# Patient Record
Sex: MALE | Race: WHITE | NOT HISPANIC OR LATINO | URBAN - METROPOLITAN AREA
[De-identification: names, ages, dates, MRNs, and addresses within clinical notes are randomized per-mention and may not be internally consistent; named-entity substitution may affect disease eponyms.]

---

## 2018-04-13 ENCOUNTER — AMBULATORY - HEALTHEAST (OUTPATIENT)
Dept: BEHAVIORAL HEALTH | Facility: CLINIC | Age: 73
End: 2018-04-13

## 2018-04-13 DIAGNOSIS — M25.532 WRIST PAIN, LEFT: ICD-10-CM

## 2018-04-13 DIAGNOSIS — G25.0 ESSENTIAL TREMOR: ICD-10-CM

## 2018-04-13 DIAGNOSIS — M79.602 ARM PAIN, LEFT: ICD-10-CM

## 2018-04-13 DIAGNOSIS — R20.0 NUMBNESS AND TINGLING: ICD-10-CM

## 2018-04-13 DIAGNOSIS — G56.20 ULNAR NEUROPATHY: ICD-10-CM

## 2018-04-13 DIAGNOSIS — R20.2 NUMBNESS AND TINGLING: ICD-10-CM

## 2018-04-13 DIAGNOSIS — R41.3 MEMORY PROBLEM: ICD-10-CM

## 2018-04-13 DIAGNOSIS — G56.00 CARPAL TUNNEL SYNDROME: ICD-10-CM

## 2018-05-15 ENCOUNTER — AMBULATORY - HEALTHEAST (OUTPATIENT)
Dept: BEHAVIORAL HEALTH | Facility: CLINIC | Age: 73
End: 2018-05-15

## 2018-05-15 DIAGNOSIS — R41.3 MEMORY PROBLEM: ICD-10-CM

## 2018-05-15 DIAGNOSIS — M79.602 ARM PAIN, LEFT: ICD-10-CM

## 2018-05-15 DIAGNOSIS — R20.0 NUMBNESS AND TINGLING: ICD-10-CM

## 2018-05-15 DIAGNOSIS — G56.20 ULNAR NEUROPATHY: ICD-10-CM

## 2018-05-15 DIAGNOSIS — R20.2 NUMBNESS AND TINGLING: ICD-10-CM

## 2018-05-15 DIAGNOSIS — G25.0 ESSENTIAL TREMOR: ICD-10-CM

## 2018-05-15 DIAGNOSIS — G31.84 MILD NEUROCOGNITIVE DISORDER: ICD-10-CM

## 2018-05-15 DIAGNOSIS — M25.532 WRIST PAIN, LEFT: ICD-10-CM

## 2018-05-15 DIAGNOSIS — G56.00 CARPAL TUNNEL SYNDROME: ICD-10-CM

## 2018-05-23 ENCOUNTER — AMBULATORY - HEALTHEAST (OUTPATIENT)
Dept: BEHAVIORAL HEALTH | Facility: CLINIC | Age: 73
End: 2018-05-23

## 2018-05-23 DIAGNOSIS — G31.84 MILD NEUROCOGNITIVE DISORDER: ICD-10-CM

## 2021-06-18 NOTE — PROGRESS NOTES
NEUROPSYCHOLOGY FEEDBACK NOTE    NAME: Bimal Leblanc  YOB: 1945     DATE OF EVALUATION: 5/23/2018      SUMMARY OF SESSION:  Bimal Leblanc is a 72 y.o.,  male with a history of Mild Neurocognitive Disorder, who was referred for a cognitive evaluation by Jace Coates MD.  Mr. Leblanc arrived on time and accompanied by his spouse. We began the session by discussing his experience during the neuropsychometric evaluation.  I provided Mr. Leblanc with detailed feedback regarding his performance on cognitive testing and his pattern of cognitive strengths and weaknesses.  I discussed my overall impressions and recommendations and provided the opportunity for Mr. Leblanc to ask any questions that he had about the evaluation.  At the end of the session, he indicated that he understood the results and that I had answered all of his questions.  He was provided with my contact information, should any further questions or concerns arise in the future.    Please contact me with any questions regarding the content of this note.     Champ Farmer, PhD, LP, ABPP-CN  Board Certified in Clinical [place] [address]  Phone: [phone]    For diagnostic and coding purposes, Bimal Leblanc has a history of Mild Neurocognitive Disorder and was referred for an evaluation of cognitive disorder.  The evaluation consisted of a total of 60 minutes of professional time .

## 2021-06-18 NOTE — PROGRESS NOTES
Neuropsychological Evaluation            Name: Bimal Leblanc                          Date of Evaluation: 05/15/2018                  Education: 12 years          Date of Birth (Age): 1945 (72)      REFERRAL QUESTION:   Referred by Jace Coates MD, for evaluation of cognitive functioning secondary patient complaints of cognitive difficulties during visits to evaluate onset of tremor.      This report was prepared by Champ Farmer, Ph.D., [facility], Board Certified Clinical Neuropsychologist.     BACKGROUND: The following report was taken from an interview with the patient, a review of his existing records, and the current neuropsychological evaluation. Mr. Leblanc is a 72-year-old, right-handed,  male patient with a medical and psychiatric history that includes arthritis, hypertension, and sleep apnea.     The results and recommendations will be discussed at the beginning of the report, with an extended report and history following.    RESULTS    Mr. Leblanc s premorbid intellectual is estimated to be in the average range based on demographic factors and performance on a measures of single word reading.     The current examination revealed impaired memory functioning. On a measure of verbally presented, thematically organized story information, he performed in the moderately impaired range for immediate recall of the information and in the moderately impaired range for delayed recall of the information. On recognition cueing for this information, he performed in the average range (18/23 correct). On a verbally presented test of word list learning he performed in the severely impaired range for recall of the word list across learning trials (3,4,3). His free recall of the word list after a delay was severely impaired (recalled 0 words freely). Recognition cueing of the word list was severely impaired (8/10 hits, 5 false positives, 3 false positives being semantically related to target words).  On a task of visual memory, he performed in the severely impaired range for immediate recall of the information across learning trials. His delayed recall of this information was severely impaired (freely recalled 0 elements). His recognition cueing of the information was mildly impaired to low average (5/6 hits, 1 false positive).     He performed in the average range on measures of verbal abstract reasoning and confrontation naming. He performed in the low average range on measures of verbal attention, processing speed requiring the matching of symbols, and visuospatial line judgment. He performed in the mildly impaired range on measures of semantic fluency and speeded word reading. He was moderately to severely impaired on measures of visuospatial reasoning, processing speed requiring transcription of symbols and numbers, speeded visual scanning, speeded visual scanning with set-shifting, phonemic fluency, speeded color naming, and times inhibition of a prepotent response. His copy of a complex figure was impaired and notable for poor planning and some signs of perseveration. His clock draw was notable for poor number placement and incomplete drawing of clock hands, but correct time placement.      Regarding his neuropsychiatric status, he endorsed minimal symptoms of depression on a self-report measure. During a recent visit to [facility] (4/9/2018) collateral questionnaire reported a high level of independence in basic and instrumental activities of daily living.      At this time, Mr. Leblanc is displaying significantly impaired initial and delayed recall across modalities. He did significantly benefit from recognition cueing on two of the memory tasks administered, suggesting some intact encoding processes. In addition to free recall deficits, he is displaying significant impairment in tasks of executive functioning (i.e., inhibition, set-shifting, planning and organization, some signs of perseveration). These  deficits appear to be more subcortical in nature, although cortical dementia cannot be fully ruled out at this time. 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 At the moment, self and collateral reports indicate that he is independent in instrumental and basic activities of daily living. His current presentation and preservation of i/ADLs indicate a diagnosis of Mild Neurocognitive Disorder, multiple etiologies. If his cognitive and/or tremor/movement related symptoms progress, it may help to more accurately identify the underlying etiology.     DIAGNOSIS  Mild Neurocognitive Disorder, multiple etiologies      IMPRESSIONS AND RECOMMENDATIONS:    1. During the current assessment, Mr. Deana peter performance was notable for significantly impaired initial acquisition and delayed recall of information across modalities as well as impaired executive functioning.     2. Mr. Leblanc s previders may wish to examine the neuroimaging study from last year, or obtain updated neuroimaging, to examine the pattern of mild atrophy noted and the degree of ischemic change noted as they may help to explain his pattern of deficits and clarify etiology.     3. Mr. Leblanc will be given handouts regarding compensatory cognitive strategies to maximize available cognitive reserve and maximize quality of life. These strategies will also be discussed within the feedback session at the end of the evaluation.    4. The current testing may act as a baseline of cognitive functioning. He is encouraged to return for a repeat evaluation in 12-18 months  time to assess for stability in his cognitive profile, which may better inform the etiology of his current presentation. If he experiences a significant change in his ability to manage i/ADLs, the  evaluation may be sooner.      Thank you for including me in the care of this patient.    Champ Farmer, Ph.D., [facility]-  Board Certified Clinical Neuropsychologist  [phone]  [email or web address]    _______________Extended Report_____________________    RELEVANT MEDICAL HISTORY    Mr. Leblanc originally presented to the [facility] clinic in 2013 for neurologic consultation and EMG for pain and neuropathy. He subsequently underwent left carpal tunnel release with reported good symptom resolution. He presented again to the [facility] [address] on 2/1/2017 for left upper arm pain. Neurological exam was generally unremarkable and MRI of the spine showed small central disc herniation at C3-4. He presented back to the [facility] [address] on 2/21/2018 for follow-up and report of new tremor bilaterally in his hands. Neurological exam at the time was generally unremarkable, with slight intention tremor noted bilaterally. At this visit, Mr. Leblanc also expressed some concern with memory problems. A follow-up appointment on 4/9/2018 noted further difficulty with short-term memory functioning. It was noted that he forgets details of conversations and that he has been misplacing items and leaving items places (e.g., leaving billfold and groceries at the store). An MMSE at the time was scored as 26/30.     The most recent MRI of the brain obtained in 2017 from [facility] noted  Mild symmetric ventricular enlargement with no hydrocephalus or midline drift. Mild associated cerebral volume loss. No cortical-based infarct. Scattered small foci of increased T2 signal in subcortical white matter. Chronic appearing lacunar infarct in the right centrum semiovale.      Recent labs have been within normal limits for TSH, Vitamin B12, Vitamin B1, and Lyme titer.     Please see the electronic record for a full review of Mr. Leblanc s medical history.    PRESENTING CONCERNS  Mr. Leblanc presented to the current evaluation with  concerns about his memory for recent events. He reported that he has been misplacing items around the home, having trouble remembering important dates, and occasionally having problems remembering details of conversations. During the clinical interview, he appeared to downplay the severity of these problems, stating that some have been longstanding and just a part of normal aging. His wife provided some information via telephone. She reported that he will forget his work key and badge often, causing him to have to return home to retrieve these items. She also reported that he keeps post-it notes with upcoming appointments and events, and that he has to check this and his part-time work schedule many times throughout the day. It was reported that these changes difficulties have become more noticeable in the last two years and that they have been progressively getting worse over time.     There is also report of a tremor, for which he recalls the onset 5-7 years ago. At this point in time, the bilateral tremor does not significantly impede his daily functioning.     Functionally, he reported that he uses a pillbox for medication management and denied any significant problems with remembering to take medication. He reported that he is managing his finances with no difficulty. He is still driving independently and denied any issues with getting lost, and denied any recent accidents or tickets.     PHYSICAL AND EMOTIONAL FUNCTIONING  Mr. Leblanc reported that his hearing and vision have been relatively stable in recent years. He denied any noticeable weakness or numbness. Weight has been reportedly stable. He reported that his sleep has been good, that he is averaging 7-8 hours a night and using his CPAP. He denied problems with balance or coordination. He denied problems with dizziness. He denied issues with headaches. He denied any issues with speech quality.     Emotionally, he reported that he feels  overwhelmed  sometimes,  but largely denied significant symptoms of anxiety or depression. He expressly denied suicidal ideation at this time.     BEHAVIORAL OBSERVATIONS:  Mr. Leblanc arrived on time for the assessment, unaccompanied. He was dressed casually, appropriately groomed, and appeared his stated age.  He ambulated with the aid of a cane. He appeared oriented to person place and time and understood the purpose of the evaluation.  Rate, rhythm, and prosody of speech were within normal limits and language was conversationally intact. No significant stuttering or word-findings problems were observed during interview or testing. His affect was full and mood appeared euthymic. There was no behavioral indication of obsessions, hallucinations, or delusional thinking during the evaluation.  The patient was pleasant and cooperative with the examiner.    TESTS ADMINISTERED:  Clinical Interview; Validated measures of effort; Wechsler Adult Intelligence Scale- 4th Edition (WAIS-IV, similarities, block design, symbol search, coding and digit span subtests only); Dunlap Verbal Learning Test (HVLT; Form 1); Wechsler Memory Scale - 4th Edition (WMS-IV, Logical Memory); Brief Visuospatial Memory Test (BVMT-R; Form 1); Trail Making Tests A & B (TMT A & B); Judgment of Line Orientation (BAM); [place] Naming Test (BNT); Phonemic Fluency (FAS); Semantic Fluency (Animal Naming); Stroop Test; Chance-Osterreith Complex Figure (Copy); Clock Drawing; Geriatric Depression Inventory     Billing and Documentation:   The current evaluation consisted of 1 hour of 00712 spent in neurobehavioral status examination and 7 hours of time spent in chart review, test administration by neuropsychologist, scoring of tests, integration/interpretation, gathering of collateral information, and report writing) for 7 units of 72908.     Interpretation   91%+    - Superior  75-90% - High Average  26-74% - Average  16-25% - Low Average  3-15%   - Mildly Impaired  1-2%   "   - Moderately Impaired  <1%      - Severely Impaired  Vinay Leblanc             Educ: 12   Age: 72   Sex: M                                    5/15/2018  TEST                             Raw   SS  WAIS-IV    Similarities                    24   10    Block Design                    6     3    Digit Span                      18    7  WAIS-IV Processing Speed          9    71    Symbol Search                   15    7    Coding                          12    2    WTAR                              21   83  WTAR Pred FSIQ                         92    MEMORY - VERBAL  WMS-IV Logical Memory    Immediate Recall                14    4    Delayed Recall                  5     4    Delayed Recognition (/23)       18  Dunlap VLT - R (Form:)           1    Immediate Recall                10   23T    Delayed Recall                  0   <21T    Delayed Discrimination          3   <21T    MEMORY - VISUAL  Brief Vis. Mem. Test - R (Form:)  1    Immediate Recall                3   <20T    Delayed Recall                  0   <20T    Delayed Discrimination          4   82-85    LANGUAGE  [place] Naming (60-Item)           56   56T    EXECUTIVE FUNCTIONS  Trails: A                        81\"   23T  Trails: B                       DC/Time  Phonemic Fluency (COWA)           14   27T  Animal Naming                     11   34T  Stroop Color & Word Test    Word                            52    5    Color                           37    4    Incongruous Color               8     2    VISUOSPATIAL  Clock Drawing                    Imp.  Judgment of Line Orientation      17    7  Chance Copy                         Imp.  Chance Time to Copy                 498\"    MOOD & PERSONALITY  Geriatric Depression Inventory    4  "